# Patient Record
Sex: MALE | Employment: OTHER | URBAN - METROPOLITAN AREA
[De-identification: names, ages, dates, MRNs, and addresses within clinical notes are randomized per-mention and may not be internally consistent; named-entity substitution may affect disease eponyms.]

---

## 2024-04-23 SDOH — HEALTH STABILITY: PHYSICAL HEALTH: ON AVERAGE, HOW MANY DAYS PER WEEK DO YOU ENGAGE IN MODERATE TO STRENUOUS EXERCISE (LIKE A BRISK WALK)?: 4 DAYS

## 2024-04-23 SDOH — HEALTH STABILITY: PHYSICAL HEALTH: ON AVERAGE, HOW MANY MINUTES DO YOU ENGAGE IN EXERCISE AT THIS LEVEL?: 40 MIN

## 2024-04-25 ENCOUNTER — OFFICE VISIT (OUTPATIENT)
Age: 69
End: 2024-04-25
Payer: MEDICARE

## 2024-04-25 VITALS
OXYGEN SATURATION: 100 % | HEIGHT: 70 IN | HEART RATE: 68 BPM | WEIGHT: 171 LBS | SYSTOLIC BLOOD PRESSURE: 118 MMHG | TEMPERATURE: 98.2 F | BODY MASS INDEX: 24.48 KG/M2 | DIASTOLIC BLOOD PRESSURE: 66 MMHG

## 2024-04-25 DIAGNOSIS — E11.9 TYPE 2 DIABETES MELLITUS WITHOUT COMPLICATION, WITHOUT LONG-TERM CURRENT USE OF INSULIN (HCC): ICD-10-CM

## 2024-04-25 DIAGNOSIS — K40.90 NON-RECURRENT UNILATERAL INGUINAL HERNIA WITHOUT OBSTRUCTION OR GANGRENE: Primary | ICD-10-CM

## 2024-04-25 DIAGNOSIS — E78.2 MIXED HYPERLIPIDEMIA: ICD-10-CM

## 2024-04-25 PROCEDURE — G8427 DOCREV CUR MEDS BY ELIG CLIN: HCPCS

## 2024-04-25 PROCEDURE — 3017F COLORECTAL CA SCREEN DOC REV: CPT

## 2024-04-25 PROCEDURE — 4004F PT TOBACCO SCREEN RCVD TLK: CPT

## 2024-04-25 PROCEDURE — 99203 OFFICE O/P NEW LOW 30 MIN: CPT

## 2024-04-25 PROCEDURE — 2022F DILAT RTA XM EVC RTNOPTHY: CPT

## 2024-04-25 PROCEDURE — G8420 CALC BMI NORM PARAMETERS: HCPCS

## 2024-04-25 PROCEDURE — 1123F ACP DISCUSS/DSCN MKR DOCD: CPT

## 2024-04-25 PROCEDURE — 3046F HEMOGLOBIN A1C LEVEL >9.0%: CPT

## 2024-04-25 RX ORDER — SIMVASTATIN 40 MG
40 TABLET ORAL NIGHTLY
COMMUNITY

## 2024-04-25 RX ORDER — TRIAMCINOLONE ACETONIDE 1 MG/G
OINTMENT TOPICAL 2 TIMES DAILY
COMMUNITY

## 2024-04-25 RX ORDER — LISINOPRIL 5 MG/1
5 TABLET ORAL DAILY
COMMUNITY

## 2024-04-25 NOTE — PROGRESS NOTES
Kwaku Cobb is an 69 y.o. male who presents to [facility]   Patient was previously receiving care at: [facility]    - Diabetes: 1000mg BID. For years. Hover around 6.8-7.2.   Highest A1c was 7.8 years ago  - HTN: Lisinopril 5mg, never had high blood pressure, seemingly takes for renal protection  - HLD: Zocor 40mg, been on this dose for many years  - Gets blood work every 3 months    - Looking for a male provider. Comes from an office where all the providers are female    Concern for hernia: Friday  - Unloading the  truck  - Feela bulging in right groin, keeps pushing it back in, never stuck out or hard    Dad October 23, prostate cancer  - PSA have been normal in the past  - Gets PSA yearly    Got colonoscopy, reports being up to date with this    3 Sons, 3 Grandsons    Exercise:  - Walks daily  - [place]    COVID: Got 4-5 shots, Flu shot uptodate  - S/p shingrex    Smoking:  - During  1.5-2 packs per day  - Quit smoking for 5-6 years  - Back to smoking cigars 1 on a good day bad day 4  - Nicotine dependence    Currently not having any complaints apart from hernia concern    Current Medications  Current medications include:   Current Outpatient Medications   Medication Sig    simvastatin (ZOCOR) 40 MG tablet Take 1 tablet by mouth nightly    metFORMIN (GLUCOPHAGE) 500 MG tablet Take 1 tablet by mouth 2 times daily (with meals) Taking 2 tablets BID    lisinopril (PRINIVIL;ZESTRIL) 5 MG tablet Take 1 tablet by mouth daily    triamcinolone (KENALOG) 0.1 % ointment Apply topically 2 times daily Apply topically 2 times daily.     No current facility-administered medications for this visit.     Allergies  No Known Allergies    Past Medical History  No past medical history on file.    Past Surgical History   No past surgical history on file.    Family History  No family history on file.    Social History  Social History     Socioeconomic History    Marital status:      Spouse

## 2024-04-25 NOTE — PROGRESS NOTES
Identified pt with two pt identifiers(name and ). Reviewed record in preparation for visit and have obtained necessary documentation.  Chief Complaint   Patient presents with    Establish Care     DM type 2     Kwaku Jackson - PCP    CC- Hernia on right side noticed last week     Sciatica right lower back   Shoulder issues from swimming (\"butterflier\")- cortisone shots in the past             Vitals:    24 1323   BP: 118/66   Pulse: 68   Temp: 98.2 °F (36.8 °C)   TempSrc: Oral   SpO2: 100%   Weight: 77.6 kg (171 lb)   Height: 1.765 m (5' 9.5\")         Coordination of Care Questionnaire:  :     \"Have you been to the ER, urgent care clinic since your last visit?  Hospitalized since your last visit?\"    NO    “Have you seen or consulted any other health care providers outside of [facility] since your last visit?”    NO        “Have you had a colorectal cancer screening such as a colonoscopy/FIT/Cologuard?    yes    No colonoscopy on file  No cologuard on file  No FIT/FOBT on file   No flexible sigmoidoscopy on file         Click Here for Release of Records Request

## 2024-04-25 NOTE — PATIENT INSTRUCTIONS
Very nice to meet you! Please schedule your lab appointment on the way out and ask about the 3 month follow up

## 2024-04-26 ENCOUNTER — NURSE ONLY (OUTPATIENT)
Age: 69
End: 2024-04-26

## 2024-04-26 DIAGNOSIS — E78.2 MIXED HYPERLIPIDEMIA: ICD-10-CM

## 2024-04-26 DIAGNOSIS — E11.9 TYPE 2 DIABETES MELLITUS WITHOUT COMPLICATION, WITHOUT LONG-TERM CURRENT USE OF INSULIN (HCC): ICD-10-CM

## 2024-04-26 LAB
ANION GAP SERPL CALC-SCNC: 7 MMOL/L (ref 5–15)
BUN SERPL-MCNC: 17 MG/DL (ref 6–20)
BUN/CREAT SERPL: 15 (ref 12–20)
CALCIUM SERPL-MCNC: 10.2 MG/DL (ref 8.5–10.1)
CHLORIDE SERPL-SCNC: 106 MMOL/L (ref 97–108)
CHOLEST SERPL-MCNC: 136 MG/DL
CO2 SERPL-SCNC: 26 MMOL/L (ref 21–32)
CREAT SERPL-MCNC: 1.13 MG/DL (ref 0.7–1.3)
ERYTHROCYTE [DISTWIDTH] IN BLOOD BY AUTOMATED COUNT: 13.7 % (ref 11.5–14.5)
EST. AVERAGE GLUCOSE BLD GHB EST-MCNC: 134 MG/DL
GLUCOSE SERPL-MCNC: 117 MG/DL (ref 65–100)
HBA1C MFR BLD: 6.3 % (ref 4–5.6)
HCT VFR BLD AUTO: 44 % (ref 36.6–50.3)
HDLC SERPL-MCNC: 39 MG/DL
HDLC SERPL: 3.5 (ref 0–5)
HGB BLD-MCNC: 14.9 G/DL (ref 12.1–17)
LDLC SERPL CALC-MCNC: 74.6 MG/DL (ref 0–100)
MCH RBC QN AUTO: 30.8 PG (ref 26–34)
MCHC RBC AUTO-ENTMCNC: 33.9 G/DL (ref 30–36.5)
MCV RBC AUTO: 91.1 FL (ref 80–99)
NRBC # BLD: 0 K/UL (ref 0–0.01)
NRBC BLD-RTO: 0 PER 100 WBC
PLATELET # BLD AUTO: 291 K/UL (ref 150–400)
PMV BLD AUTO: 11.9 FL (ref 8.9–12.9)
POTASSIUM SERPL-SCNC: 4.2 MMOL/L (ref 3.5–5.1)
RBC # BLD AUTO: 4.83 M/UL (ref 4.1–5.7)
SODIUM SERPL-SCNC: 139 MMOL/L (ref 136–145)
TRIGL SERPL-MCNC: 112 MG/DL
VLDLC SERPL CALC-MCNC: 22.4 MG/DL
WBC # BLD AUTO: 10.3 K/UL (ref 4.1–11.1)

## 2024-06-11 ENCOUNTER — HOSPITAL ENCOUNTER (OUTPATIENT)
Facility: HOSPITAL | Age: 69
Discharge: HOME | End: 2024-06-14
Payer: MEDICARE

## 2024-06-11 VITALS
OXYGEN SATURATION: 97 % | WEIGHT: 171.1 LBS | DIASTOLIC BLOOD PRESSURE: 69 MMHG | TEMPERATURE: 97.8 F | HEIGHT: 69 IN | BODY MASS INDEX: 25.34 KG/M2 | SYSTOLIC BLOOD PRESSURE: 119 MMHG | RESPIRATION RATE: 18 BRPM | HEART RATE: 74 BPM

## 2024-06-11 LAB
ANION GAP SERPL CALC-SCNC: 3 MMOL/L (ref 5–15)
BUN SERPL-MCNC: 19 MG/DL (ref 6–20)
BUN/CREAT SERPL: 17 (ref 12–20)
CALCIUM SERPL-MCNC: 9.7 MG/DL (ref 8.5–10.1)
CHLORIDE SERPL-SCNC: 106 MMOL/L (ref 97–108)
CO2 SERPL-SCNC: 29 MMOL/L (ref 21–32)
CREAT SERPL-MCNC: 1.1 MG/DL (ref 0.7–1.3)
GLUCOSE SERPL-MCNC: 111 MG/DL (ref 65–100)
POTASSIUM SERPL-SCNC: 4.6 MMOL/L (ref 3.5–5.1)
SARS-COV-2 RNA RESP QL NAA+PROBE: NOT DETECTED
SODIUM SERPL-SCNC: 138 MMOL/L (ref 136–145)
SOURCE: NORMAL

## 2024-06-11 PROCEDURE — 93005 ELECTROCARDIOGRAM TRACING: CPT | Performed by: SURGERY

## 2024-06-11 PROCEDURE — 87635 SARS-COV-2 COVID-19 AMP PRB: CPT

## 2024-06-11 PROCEDURE — 80048 BASIC METABOLIC PNL TOTAL CA: CPT

## 2024-06-11 PROCEDURE — 36415 COLL VENOUS BLD VENIPUNCTURE: CPT

## 2024-06-11 NOTE — DISCHARGE INSTRUCTIONS
[facility]                   [address] [place] [address]   MAIN OR                                  [phone]   MAIN PRE OP                          [phone]                                                                                AMBULATORY PRE OP          [phone]  PRE-ADMISSION TESTING    [phone]     Surgery Date:  Tuesday 6/25/24       Is surgery arrival time given by surgeon?   If “NO”, [facility] staff will call you between 3 and 7pm the day before your surgery with your arrival time.    Call [phone] after 7pm Monday-Friday if you did not receive this call.    INSTRUCTIONS BEFORE YOUR SURGERY   When You  Arrive Free  parking is available from 7:00 AM - 5:00 PM.  Arrive at the 2nd Floor Admitting Desk on the day of your surgery  Have your insurance card, photo ID, and any copayment (if needed)   Food   and   Drink NO food or drink after midnight the night before surgery    This means NO water, gum, mints, coffee, juice, etc.  No alcohol (beer, wine, liquor) 24 hours before and after surgery   Pre- operative  Medication Instructions   MEDICATIONS TO TAKE THE MORNING OF SURGERY WITH A SIP OF WATER:     None  DO NOT TAKE THE FOLLOWING MEDICATIONS THE MORNING OF SURGERY:                             Lisinopril, Metformin    DO NOT TAKE THE FOLLOWING MEDICATIONS THE EVENING BEFORE SURGERY:                  N/A  CONTACT PRESCRIBING DOCTOR TO OBTAIN INSTRUCTIONS FOR THE FOLLOWING MEDICATIONS:                                N/A    Tylenol may be taken as needed.   Medications  To  STOP      7 days before surgery Non-Steroidal anti-inflammatory Drugs (NSAID's): for example, Ibuprofen (Advil, Motrin), Naproxen (Aleve)  Aspirin, if taking for pain   Herbal supplements, vitamins, and fish oil  Other:     Blood  Thinners If you take  Aspirin, Plavix, Coumadin, or any blood-thinning or anti-blood clot medicine, talk to the doctor who prescribed the

## 2024-06-12 LAB
EKG ATRIAL RATE: 58 BPM
EKG DIAGNOSIS: NORMAL
EKG P AXIS: 67 DEGREES
EKG P-R INTERVAL: 144 MS
EKG Q-T INTERVAL: 402 MS
EKG QRS DURATION: 82 MS
EKG QTC CALCULATION (BAZETT): 394 MS
EKG T AXIS: 66 DEGREES
EKG VENTRICULAR RATE: 58 BPM

## 2024-06-13 ENCOUNTER — OFFICE VISIT (OUTPATIENT)
Age: 69
End: 2024-06-13
Payer: MEDICARE

## 2024-06-13 VITALS
TEMPERATURE: 98.5 F | DIASTOLIC BLOOD PRESSURE: 70 MMHG | WEIGHT: 172 LBS | HEART RATE: 82 BPM | BODY MASS INDEX: 24.62 KG/M2 | OXYGEN SATURATION: 95 % | RESPIRATION RATE: 18 BRPM | SYSTOLIC BLOOD PRESSURE: 120 MMHG | HEIGHT: 70 IN

## 2024-06-13 DIAGNOSIS — B34.9 VIRAL ILLNESS: ICD-10-CM

## 2024-06-13 DIAGNOSIS — R05.2 SUBACUTE COUGH: Primary | ICD-10-CM

## 2024-06-13 PROCEDURE — 99212 OFFICE O/P EST SF 10 MIN: CPT

## 2024-06-13 SDOH — ECONOMIC STABILITY: FOOD INSECURITY: WITHIN THE PAST 12 MONTHS, THE FOOD YOU BOUGHT JUST DIDN'T LAST AND YOU DIDN'T HAVE MONEY TO GET MORE.: NEVER TRUE

## 2024-06-13 SDOH — ECONOMIC STABILITY: FOOD INSECURITY: WITHIN THE PAST 12 MONTHS, YOU WORRIED THAT YOUR FOOD WOULD RUN OUT BEFORE YOU GOT MONEY TO BUY MORE.: NEVER TRUE

## 2024-06-13 SDOH — ECONOMIC STABILITY: HOUSING INSECURITY
IN THE LAST 12 MONTHS, WAS THERE A TIME WHEN YOU DID NOT HAVE A STEADY PLACE TO SLEEP OR SLEPT IN A SHELTER (INCLUDING NOW)?: NO

## 2024-06-13 SDOH — ECONOMIC STABILITY: INCOME INSECURITY: HOW HARD IS IT FOR YOU TO PAY FOR THE VERY BASICS LIKE FOOD, HOUSING, MEDICAL CARE, AND HEATING?: NOT HARD AT ALL

## 2024-06-13 ASSESSMENT — PATIENT HEALTH QUESTIONNAIRE - PHQ9
SUM OF ALL RESPONSES TO PHQ QUESTIONS 1-9: 0
2. FEELING DOWN, DEPRESSED OR HOPELESS: NOT AT ALL
SUM OF ALL RESPONSES TO PHQ9 QUESTIONS 1 & 2: 0
1. LITTLE INTEREST OR PLEASURE IN DOING THINGS: NOT AT ALL
SUM OF ALL RESPONSES TO PHQ QUESTIONS 1-9: 0

## 2024-06-13 NOTE — PROGRESS NOTES
Identified pt with two pt identifiers(name and ). Reviewed record in preparation for visit and have obtained necessary documentation.  Chief Complaint   Patient presents with    Cough     Started in May with drainage running down throat causing cough ( prod at times with green mucous)    Denies sinus pressure with headache    Denies F/C or shortness of breath    Having 2024 for [facility]     Health Maintenance Due   Topic    COVID-19 Vaccine (1)    Pneumococcal 65+ years Vaccine (1 of 2 - PCV)    Depression Screen     Diabetic Alb to Cr ratio (uACR) test     Hepatitis C screen     DTaP/Tdap/Td vaccine (1 - Tdap)    Colorectal Cancer Screen     Shingles vaccine (1 of 2)    Respiratory Syncytial Virus (RSV) Pregnant or age 60 yrs+ (1 - 1-dose 60+ series)    AAA screen     Annual Wellness Visit (Medicare)        There were no vitals filed for this visit.    Social Determinants Of Health:       SDOH screening not required at visit.  Resources Declined.   See AVS for attached resources, if requested.    Coordination of Care Questionnaire:  :   @ROOMING->ROOMING(CIRCULAR REFERENCE)@    This patient is accompanied in the office by his self.  I have received verbal consent from Kwaku Cobb to discuss any/all medical information while they are present in the room.

## 2024-06-24 ENCOUNTER — ANESTHESIA EVENT (OUTPATIENT)
Facility: HOSPITAL | Age: 69
End: 2024-06-24
Payer: MEDICARE

## 2024-06-25 ENCOUNTER — HOSPITAL ENCOUNTER (OUTPATIENT)
Facility: HOSPITAL | Age: 69
Setting detail: OUTPATIENT SURGERY
Discharge: HOME | End: 2024-06-25
Attending: SURGERY | Admitting: SURGERY
Payer: MEDICARE

## 2024-06-25 ENCOUNTER — ANESTHESIA (OUTPATIENT)
Facility: HOSPITAL | Age: 69
End: 2024-06-25
Payer: MEDICARE

## 2024-06-25 VITALS
DIASTOLIC BLOOD PRESSURE: 55 MMHG | HEART RATE: 81 BPM | RESPIRATION RATE: 15 BRPM | SYSTOLIC BLOOD PRESSURE: 118 MMHG | WEIGHT: 171.08 LBS | OXYGEN SATURATION: 95 % | BODY MASS INDEX: 24.9 KG/M2 | TEMPERATURE: 97.6 F

## 2024-06-25 DIAGNOSIS — K40.90 INGUINAL HERNIA WITHOUT OBSTRUCTION OR GANGRENE, RECURRENCE NOT SPECIFIED, UNSPECIFIED LATERALITY: Primary | ICD-10-CM

## 2024-06-25 LAB
GLUCOSE BLD STRIP.AUTO-MCNC: 110 MG/DL (ref 65–117)
GLUCOSE BLD STRIP.AUTO-MCNC: 124 MG/DL (ref 65–117)
SERVICE CMNT-IMP: ABNORMAL
SERVICE CMNT-IMP: NORMAL

## 2024-06-25 PROCEDURE — 6370000000 HC RX 637 (ALT 250 FOR IP): Performed by: SURGERY

## 2024-06-25 PROCEDURE — 2580000003 HC RX 258: Performed by: ANESTHESIOLOGY

## 2024-06-25 PROCEDURE — S2900 ROBOTIC SURGICAL SYSTEM: HCPCS | Performed by: SURGERY

## 2024-06-25 PROCEDURE — 6360000002 HC RX W HCPCS: Performed by: SURGERY

## 2024-06-25 PROCEDURE — 6360000002 HC RX W HCPCS: Performed by: NURSE ANESTHETIST, CERTIFIED REGISTERED

## 2024-06-25 PROCEDURE — 3600000009 HC SURGERY ROBOT BASE: Performed by: SURGERY

## 2024-06-25 PROCEDURE — C1781 MESH (IMPLANTABLE): HCPCS | Performed by: SURGERY

## 2024-06-25 PROCEDURE — 3700000001 HC ADD 15 MINUTES (ANESTHESIA): Performed by: SURGERY

## 2024-06-25 PROCEDURE — C9290 INJ, BUPIVACAINE LIPOSOME: HCPCS | Performed by: SURGERY

## 2024-06-25 PROCEDURE — 7100000010 HC PHASE II RECOVERY - FIRST 15 MIN: Performed by: SURGERY

## 2024-06-25 PROCEDURE — 7100000000 HC PACU RECOVERY - FIRST 15 MIN: Performed by: SURGERY

## 2024-06-25 PROCEDURE — 2709999900 HC NON-CHARGEABLE SUPPLY: Performed by: SURGERY

## 2024-06-25 PROCEDURE — 2500000003 HC RX 250 WO HCPCS: Performed by: NURSE ANESTHETIST, CERTIFIED REGISTERED

## 2024-06-25 PROCEDURE — 3600000019 HC SURGERY ROBOT ADDTL 15MIN: Performed by: SURGERY

## 2024-06-25 PROCEDURE — 7100000001 HC PACU RECOVERY - ADDTL 15 MIN: Performed by: SURGERY

## 2024-06-25 PROCEDURE — 2580000003 HC RX 258: Performed by: SURGERY

## 2024-06-25 PROCEDURE — 82962 GLUCOSE BLOOD TEST: CPT

## 2024-06-25 PROCEDURE — 3700000000 HC ANESTHESIA ATTENDED CARE: Performed by: SURGERY

## 2024-06-25 DEVICE — 3DMAX MESH, 10.8 CM X 16.0 CM (4.3" X 6.3"), RIGHT, LARGE
Type: IMPLANTABLE DEVICE | Site: INGUINAL | Status: FUNCTIONAL
Brand: 3DMAX

## 2024-06-25 RX ORDER — MIDAZOLAM HYDROCHLORIDE 2 MG/2ML
2 INJECTION, SOLUTION INTRAMUSCULAR; INTRAVENOUS
Status: DISCONTINUED | OUTPATIENT
Start: 2024-06-25 | End: 2024-06-25 | Stop reason: HOSPADM

## 2024-06-25 RX ORDER — ONDANSETRON 2 MG/ML
4 INJECTION INTRAMUSCULAR; INTRAVENOUS
Status: DISCONTINUED | OUTPATIENT
Start: 2024-06-25 | End: 2024-06-25 | Stop reason: HOSPADM

## 2024-06-25 RX ORDER — DEXAMETHASONE SODIUM PHOSPHATE 4 MG/ML
INJECTION, SOLUTION INTRA-ARTICULAR; INTRALESIONAL; INTRAMUSCULAR; INTRAVENOUS; SOFT TISSUE PRN
Status: DISCONTINUED | OUTPATIENT
Start: 2024-06-25 | End: 2024-06-25 | Stop reason: SDUPTHER

## 2024-06-25 RX ORDER — DIPHENHYDRAMINE HYDROCHLORIDE 50 MG/ML
12.5 INJECTION INTRAMUSCULAR; INTRAVENOUS
Status: DISCONTINUED | OUTPATIENT
Start: 2024-06-25 | End: 2024-06-25 | Stop reason: HOSPADM

## 2024-06-25 RX ORDER — FENTANYL CITRATE 50 UG/ML
INJECTION, SOLUTION INTRAMUSCULAR; INTRAVENOUS PRN
Status: DISCONTINUED | OUTPATIENT
Start: 2024-06-25 | End: 2024-06-25 | Stop reason: SDUPTHER

## 2024-06-25 RX ORDER — LIDOCAINE HYDROCHLORIDE 10 MG/ML
1 INJECTION, SOLUTION EPIDURAL; INFILTRATION; INTRACAUDAL; PERINEURAL
Status: DISCONTINUED | OUTPATIENT
Start: 2024-06-25 | End: 2024-06-25 | Stop reason: HOSPADM

## 2024-06-25 RX ORDER — SODIUM CHLORIDE, SODIUM LACTATE, POTASSIUM CHLORIDE, CALCIUM CHLORIDE 600; 310; 30; 20 MG/100ML; MG/100ML; MG/100ML; MG/100ML
INJECTION, SOLUTION INTRAVENOUS CONTINUOUS
Status: DISCONTINUED | OUTPATIENT
Start: 2024-06-25 | End: 2024-06-25 | Stop reason: HOSPADM

## 2024-06-25 RX ORDER — ROCURONIUM BROMIDE 10 MG/ML
INJECTION, SOLUTION INTRAVENOUS PRN
Status: DISCONTINUED | OUTPATIENT
Start: 2024-06-25 | End: 2024-06-25 | Stop reason: SDUPTHER

## 2024-06-25 RX ORDER — PROPOFOL 10 MG/ML
INJECTION, EMULSION INTRAVENOUS PRN
Status: DISCONTINUED | OUTPATIENT
Start: 2024-06-25 | End: 2024-06-25 | Stop reason: SDUPTHER

## 2024-06-25 RX ORDER — ONDANSETRON 2 MG/ML
INJECTION INTRAMUSCULAR; INTRAVENOUS PRN
Status: DISCONTINUED | OUTPATIENT
Start: 2024-06-25 | End: 2024-06-25 | Stop reason: SDUPTHER

## 2024-06-25 RX ORDER — NALOXONE HYDROCHLORIDE 0.4 MG/ML
INJECTION, SOLUTION INTRAMUSCULAR; INTRAVENOUS; SUBCUTANEOUS PRN
Status: DISCONTINUED | OUTPATIENT
Start: 2024-06-25 | End: 2024-06-25 | Stop reason: HOSPADM

## 2024-06-25 RX ORDER — LIDOCAINE HYDROCHLORIDE 20 MG/ML
INJECTION, SOLUTION EPIDURAL; INFILTRATION; INTRACAUDAL; PERINEURAL PRN
Status: DISCONTINUED | OUTPATIENT
Start: 2024-06-25 | End: 2024-06-25 | Stop reason: SDUPTHER

## 2024-06-25 RX ORDER — FENTANYL CITRATE 50 UG/ML
100 INJECTION, SOLUTION INTRAMUSCULAR; INTRAVENOUS
Status: DISCONTINUED | OUTPATIENT
Start: 2024-06-25 | End: 2024-06-25 | Stop reason: HOSPADM

## 2024-06-25 RX ORDER — MIDAZOLAM HYDROCHLORIDE 1 MG/ML
INJECTION INTRAMUSCULAR; INTRAVENOUS PRN
Status: DISCONTINUED | OUTPATIENT
Start: 2024-06-25 | End: 2024-06-25 | Stop reason: SDUPTHER

## 2024-06-25 RX ORDER — SUCCINYLCHOLINE/SOD CL,ISO/PF 100 MG/5ML
SYRINGE (ML) INTRAVENOUS PRN
Status: DISCONTINUED | OUTPATIENT
Start: 2024-06-25 | End: 2024-06-25 | Stop reason: SDUPTHER

## 2024-06-25 RX ORDER — KETOROLAC TROMETHAMINE 30 MG/ML
INJECTION, SOLUTION INTRAMUSCULAR; INTRAVENOUS PRN
Status: DISCONTINUED | OUTPATIENT
Start: 2024-06-25 | End: 2024-06-25 | Stop reason: SDUPTHER

## 2024-06-25 RX ORDER — ACETAMINOPHEN 325 MG/1
650 TABLET ORAL ONCE
Status: COMPLETED | OUTPATIENT
Start: 2024-06-25 | End: 2024-06-25

## 2024-06-25 RX ORDER — HYDROCODONE BITARTRATE AND ACETAMINOPHEN 5; 325 MG/1; MG/1
1 TABLET ORAL EVERY 6 HOURS PRN
Qty: 10 TABLET | Refills: 0 | Status: SHIPPED | OUTPATIENT
Start: 2024-06-25 | End: 2024-06-28

## 2024-06-25 RX ORDER — ONDANSETRON 4 MG/1
4 TABLET, ORALLY DISINTEGRATING ORAL 3 TIMES DAILY PRN
Qty: 14 TABLET | Refills: 0 | Status: SHIPPED | OUTPATIENT
Start: 2024-06-25

## 2024-06-25 RX ORDER — EPHEDRINE SULFATE/0.9% NACL/PF 25 MG/5 ML
SYRINGE (ML) INTRAVENOUS PRN
Status: DISCONTINUED | OUTPATIENT
Start: 2024-06-25 | End: 2024-06-25 | Stop reason: SDUPTHER

## 2024-06-25 RX ADMIN — MIDAZOLAM HYDROCHLORIDE 2 MG: 1 INJECTION, SOLUTION INTRAMUSCULAR; INTRAVENOUS at 14:45

## 2024-06-25 RX ADMIN — WATER 2000 MG: 1 INJECTION INTRAMUSCULAR; INTRAVENOUS; SUBCUTANEOUS at 15:04

## 2024-06-25 RX ADMIN — LIDOCAINE HYDROCHLORIDE 40 MG: 20 INJECTION, SOLUTION EPIDURAL; INFILTRATION; INTRACAUDAL; PERINEURAL at 14:57

## 2024-06-25 RX ADMIN — SUGAMMADEX 200 MG: 100 INJECTION, SOLUTION INTRAVENOUS at 16:21

## 2024-06-25 RX ADMIN — KETOROLAC TROMETHAMINE 30 MG: 30 INJECTION, SOLUTION INTRAMUSCULAR at 16:10

## 2024-06-25 RX ADMIN — ACETAMINOPHEN 650 MG: 325 TABLET ORAL at 11:31

## 2024-06-25 RX ADMIN — FENTANYL CITRATE 100 MCG: 50 INJECTION, SOLUTION INTRAMUSCULAR; INTRAVENOUS at 15:32

## 2024-06-25 RX ADMIN — SODIUM CHLORIDE, POTASSIUM CHLORIDE, SODIUM LACTATE AND CALCIUM CHLORIDE: 600; 310; 30; 20 INJECTION, SOLUTION INTRAVENOUS at 11:37

## 2024-06-25 RX ADMIN — EPHEDRINE SULFATE 10 MG: 5 INJECTION INTRAVENOUS at 16:03

## 2024-06-25 RX ADMIN — SODIUM CHLORIDE, POTASSIUM CHLORIDE, SODIUM LACTATE AND CALCIUM CHLORIDE: 600; 310; 30; 20 INJECTION, SOLUTION INTRAVENOUS at 11:36

## 2024-06-25 RX ADMIN — Medication 100 MG: at 14:58

## 2024-06-25 RX ADMIN — ROCURONIUM BROMIDE 10 MG: 10 INJECTION, SOLUTION INTRAVENOUS at 14:56

## 2024-06-25 RX ADMIN — LIDOCAINE HYDROCHLORIDE 20 MG: 20 INJECTION, SOLUTION EPIDURAL; INFILTRATION; INTRACAUDAL; PERINEURAL at 14:56

## 2024-06-25 RX ADMIN — FENTANYL CITRATE 100 MCG: 50 INJECTION, SOLUTION INTRAMUSCULAR; INTRAVENOUS at 14:52

## 2024-06-25 RX ADMIN — ONDANSETRON 4 MG: 2 INJECTION INTRAMUSCULAR; INTRAVENOUS at 16:06

## 2024-06-25 RX ADMIN — PROPOFOL 150 MG: 10 INJECTION, EMULSION INTRAVENOUS at 14:57

## 2024-06-25 RX ADMIN — ROCURONIUM BROMIDE 30 MG: 10 INJECTION, SOLUTION INTRAVENOUS at 15:02

## 2024-06-25 RX ADMIN — DEXAMETHASONE SODIUM PHOSPHATE 4 MG: 4 INJECTION INTRA-ARTICULAR; INTRALESIONAL; INTRAMUSCULAR; INTRAVENOUS; SOFT TISSUE at 15:12

## 2024-06-25 ASSESSMENT — PAIN - FUNCTIONAL ASSESSMENT
PAIN_FUNCTIONAL_ASSESSMENT: NONE - DENIES PAIN
PAIN_FUNCTIONAL_ASSESSMENT: ACTIVITIES ARE NOT PREVENTED

## 2024-06-25 ASSESSMENT — PAIN DESCRIPTION - FREQUENCY: FREQUENCY: CONTINUOUS

## 2024-06-25 ASSESSMENT — PAIN DESCRIPTION - ONSET: ONSET: OTHER (COMMENT)

## 2024-06-25 ASSESSMENT — PAIN SCALES - GENERAL
PAINLEVEL_OUTOF10: 2
PAINLEVEL_OUTOF10: 0
PAINLEVEL_OUTOF10: 0

## 2024-06-25 ASSESSMENT — PAIN DESCRIPTION - DESCRIPTORS: DESCRIPTORS: SORE

## 2024-06-25 ASSESSMENT — PAIN DESCRIPTION - LOCATION: LOCATION: ABDOMEN

## 2024-06-25 ASSESSMENT — PAIN DESCRIPTION - ORIENTATION: ORIENTATION: MID

## 2024-06-25 ASSESSMENT — LIFESTYLE VARIABLES: SMOKING_STATUS: 1

## 2024-06-25 ASSESSMENT — PAIN DESCRIPTION - PAIN TYPE: TYPE: SURGICAL PAIN

## 2024-06-25 NOTE — BRIEF OP NOTE
Brief Postoperative Note      Patient: Kwaku Cobb  YOB: 1955  MRN: [id number]    Date of Procedure: 6/25/2024    Pre-Op Diagnosis Codes:     * Non-recurrent inguinal hernia without obstruction or gangrene, unspecified laterality [K40.90]    Post-Op Diagnosis: Same       Procedure(s):  ROBOTIC RIGHT INGUINAL HERNIA REPAIR WITH MESH    Surgeon(s):  Sim Rose MD    Assistant:  Surgical Assistant: Lesli Contreras    Anesthesia: General    Estimated Blood Loss (mL): Minimal    Complications: None    Specimens:   * No specimens in log *    Implants:  Implant Name Type Inv. Item Serial No.  Lot No. LRB No. Used Action   MESH PATSY L [id number] R INGUINAL WHT POLYPR MFIL - SN/A  MESH PATSY L [id number] R INGUINAL WHT POLYPR MFIL N/A BARD DAVOL-WD [id number] Right 1 Implanted         Drains: * No LDAs found *    Findings:  Infection Present At Time Of Surgery (PATOS) (choose all levels that have infection present):  No infection present  Other Findings: Indirect defect    Electronically signed by Sim Rose MD on 6/25/2024 at 4:30 PM

## 2024-06-25 NOTE — ANESTHESIA POSTPROCEDURE EVALUATION
Department of Anesthesiology  Postprocedure Note    Patient: Kwaku Cobb  MRN: [id number]  YOB: 1955  Date of evaluation: 6/25/2024    Procedure Summary       Date: 06/25/24 Room / Location: [facility] MAIN OR  / M MAIN OR    Anesthesia Start: 1445 Anesthesia Stop: 1641    Procedure: ROBOTIC RIGHT INGUINAL HERNIA REPAIR WITH MESH (Abdomen) Diagnosis:       Non-recurrent inguinal hernia without obstruction or gangrene, unspecified laterality      (Non-recurrent inguinal hernia without obstruction or gangrene, unspecified laterality [K40.90])    Surgeons: Sim Rose MD Responsible Provider: Wilver Zurita MD    Anesthesia Type: general ASA Status: 2            Anesthesia Type: No value filed.    Tabitha Phase I: Tabitha Score: 8    Tabitha Phase II:      Anesthesia Post Evaluation    Patient location during evaluation: PACU  Patient participation: complete - patient participated  Level of consciousness: awake  Airway patency: patent  Nausea & Vomiting: no vomiting and no nausea  Cardiovascular status: hemodynamically stable  Respiratory status: acceptable  Hydration status: stable  Pain management: adequate    No notable events documented.

## 2024-06-25 NOTE — OP NOTE
Operative Note      Patient: Kwaku Cobb  YOB: 1955  MRN: [id number]    Date of Procedure: 6/25/2024    Pre-Op Diagnosis Codes:     * Non-recurrent inguinal hernia without obstruction or gangrene, unspecified laterality [K40.90]    Post-Op Diagnosis: Same       Procedure(s):  ROBOTIC RIGHT INGUINAL HERNIA REPAIR WITH MESH    Surgeon(s):  Sim Rose MD    Assistant:  Surgical Assistant: Lesli Contreras    Anesthesia: General    Estimated Blood Loss (mL): Minimal    Complications: None    Specimens:   * No specimens in log *    Implants:  Implant Name Type Inv. Item Serial No.  Lot No. LRB No. Used Action   MESH PATSY L [id number] R INGUINAL WHT POLYPR MFIL - SN/A  MESH PATSY L [id number] R INGUINAL WHT POLYPR MFIL N/A BARD DAVOL-WD [id number] Right 1 Implanted         Drains: * No LDAs found *    Findings:  Infection Present At Time Of Surgery (PATOS) (choose all levels that have infection present):  No infection present  Other Findings: Indirect defect    Electronically signed by Sim Rose MD on 6/25/2024 at 4:31 PM    Indication:  See H/P.    Procedure:  Site of surgery and procedure was verified and marked in pre-operative holding and again in the operating room.  Preoperative antibiotics were given 30 minutes prior to skin incision.  Sequential compression devices were placed and turned on.  Patient placed supine and general anesthesia was instituted sucessfully.  Both arms were tucked and the abdomen was prepped and draped in usual fashion.  Wade catheter was placed.  Exparel expanded with 0.5% plain marcaine was injected subcutaneously along the projected port sites.   Supra-umbilical incision was made, 5mm port was passed across the umbilical hernia under direct vision.  Insufflation was establised and maintained.  8mm ports were placed in usual position and the 5mm port was exchanged for 8mm port.  Robot was brought in and docked.    Dav's ligament, the inferior

## 2024-06-25 NOTE — DISCHARGE SUMMARY
Discharge Summary    Patient: Kwaku Cobb               Sex: male          DOA: 6/25/2024 10:40 AM       YOB: 1955      Age:  69 y.o.        LOS:  LOS: 0 days                Discharge Date:      Admission Diagnoses: Non-recurrent inguinal hernia without obstruction or gangrene, unspecified laterality [K40.90]    Discharge Diagnoses:  Same    Procedure:  Procedure(s):  ROBOTIC RIGHT INGUINAL HERNIA REPAIR WITH MESH    Discharge Condition: Good    Hospital Course: Unremarkable operative procedure.  Discharge to home in stable condition.      Consults: None    Significant Diagnostic Studies: See full electronic record.     Discharge Medications:     Current Discharge Medication List        CONTINUE these medications which have NOT CHANGED    Details   Multiple Vitamins-Minerals (MULTIVITAMIN MEN 50+) TABS Take by mouth daily      Phenyleph-Doxylamine-DM-APAP (NYQUIL SEVERE COLD/FLU) 5-6.25- MG CAPS Take by mouth as needed      simvastatin (ZOCOR) 40 MG tablet Take 1 tablet by mouth nightly      metFORMIN (GLUCOPHAGE) 500 MG tablet Take 1 tablet by mouth 2 times daily (with meals) Taking 2 tablets BID      lisinopril (PRINIVIL;ZESTRIL) 5 MG tablet Take 1 tablet by mouth daily      triamcinolone (KENALOG) 0.1 % ointment Apply topically 2 times daily Apply topically 2 times daily.           Activity/Diet/Wound Care: See patient administered discharge instructions.    Follow-up: Keep follow up appointment made by Cheri Rose MD  [facility]  Office:  [phone]  Fax:  [phone]

## 2024-06-25 NOTE — H&P
Stability Vital Sign     Unstable Housing in the Last Year: No      Current Facility-Administered Medications   Medication Dose Route Frequency    lidocaine PF 1 % injection 1 mL  1 mL IntraDERmal Once PRN    fentaNYL (SUBLIMAZE) injection 100 mcg  100 mcg IntraVENous Once PRN    lactated ringers IV soln infusion   IntraVENous Continuous    midazolam PF (VERSED) injection 2 mg  2 mg IntraVENous Once PRN    ceFAZolin (ANCEF) 2,000 mg in sterile water 20 mL IV syringe  2,000 mg IntraVENous Once    lactated ringers IV soln infusion   IntraVENous Continuous      Allergies   Allergen Reactions    Alpha-Gal Itching       Review of Systems:     []     Unable to obtain  ROS due to  []    mental status change  []    sedated   []    intubated   [x]    Total of 12 system negative, unless specified below or in HPI:  Constitutional: negative fever, negative chills, negative weight loss  Eyes:   negative visual changes  ENT:   negative sore throat, tongue or lip swelling  Respiratory:  negative cough, negative dyspnea  Cards:  negative for chest pain, palpitations, lower extremity edema  GI:   negative for nausea, vomiting, diarrhea, and abdominal pain  :  negative for frequency, dysuria  Integument:  negative for rash and pruritus  Heme:  negative for easy bruising and gum/nose bleeding  Musculoskel: negative for myalgias,  back pain and muscle weakness  Neuro:  negative for headaches, dizziness, vertigo  Psych:  negative for feelings of anxiety, depression     Objective:      Patient Vitals for the past 8 hrs:   BP Temp Temp src Pulse Resp SpO2 Weight   24 1144 126/68 98.3 °F (36.8 °C) Oral 71 14 99 % --   24 1044 -- -- -- -- -- -- 77.6 kg (171 lb 1.2 oz)       Temp (24hrs), Av.3 °F (36.8 °C), Min:98.3 °F (36.8 °C), Max:98.3 °F (36.8 °C)      Physical Exam:  General:  Alert, cooperative, no distress, appears stated age.   Eyes:  Conjunctivae/corneas clear. PERRL, EOMs intact.   Nose: Nares normal. Septum

## 2024-06-25 NOTE — ANESTHESIA PRE PROCEDURE
06/11/2024 11:58 AM    CREATININE 1.10 06/11/2024 11:58 AM    LABGLOM 74 06/11/2024 11:58 AM    LABGLOM 71 04/26/2024 09:25 AM    GLUCOSE 111 06/11/2024 11:58 AM    CALCIUM 9.7 06/11/2024 11:58 AM       POC Tests:   Recent Labs     06/25/24  1121   POCGLU 124*       Coags: No results found for: \"PROTIME\", \"INR\", \"APTT\"    HCG (If Applicable): No results found for: \"PREGTESTUR\", \"PREGSERUM\", \"HCG\", \"HCGQUANT\"     ABGs: No results found for: \"PHART\", \"PO2ART\", \"ZWM8LJQ\", \"FQZ8YZH\", \"BEART\", \"N0UBVLRD\"     Type & Screen (If Applicable):  No results found for: \"LABABO\"    Drug/Infectious Status (If Applicable):  No results found for: \"HIV\", \"HEPCAB\"    COVID-19 Screening (If Applicable):   Lab Results   Component Value Date/Time    COVID19 Not detected 06/11/2024 11:58 AM           Anesthesia Evaluation     no history of anesthetic complications:   Airway: Mallampati: II     Neck ROM: full  Mouth opening: > = 3 FB   Dental: normal exam         Pulmonary: breath sounds clear to auscultation  (+)    recent URI: resolved,  sleep apnea:       current smoker                           Cardiovascular:Negative CV ROS  Exercise tolerance: good (>4 METS)          Rhythm: regular  Rate: normal                    Neuro/Psych:   Negative Neuro/Psych ROS              GI/Hepatic/Renal:             Endo/Other:    (+) Diabetes.                 Abdominal:             Vascular: negative vascular ROS.         Other Findings:       Anesthesia Plan      general     ASA 2       Induction: intravenous.    MIPS: Postoperative opioids intended and Prophylactic antiemetics administered.  Anesthetic plan and risks discussed with patient.      Plan discussed with CRNA.                Wilver Zurita MD   6/25/2024

## 2024-06-25 NOTE — DISCHARGE INSTRUCTIONS
POST-OPERATIVE INSTRUCTIONS  OUTPATIENT SURGERY LAPAROSCOPIC/ROBOT ASSISTED HERNIA REPAIR    Today you underwent hernia repair which is usually well tolerated. However, below is a list of instructions which are important for you to follow. If you have any questions, please feel free to call the main office.    1. EATING: Please eat lightly when you go home today for the first 24 hours. You may resume your regular diet after that.    2. EXERCISE: Limit your exercise for the first week.  Stairs or other walking is fine, increasing the amount of aerobic activity after the first week.  No heavy lifting for four weeks.     3. DRESSING: You may shower in 24 hours; the clear dressing can get wet. 48 hours after your surgery, the top clear bandage may be removed and left undressed or covered with a lighter dressing.  Do not bathe in a tub or pool for at least 4 weeks.    4. NO LIFTING: Until you have seen your surgeon in his/her office following surgery, at which time you will receive further instructions.    5. DRIVING: No driving for at least 48 hours postoperatively.  When you are able to tolerate post-surgical pain WITHOUT the use of narcotics and manage the car without increased pain or restriction you may drive.  If your vehicle requires you to pull or hoist yourself into the vehicle, driving that vehicle is not advised.  You may ride as a passenger anytime. Otherwise, wait until the follow up visit.    6. PAIN: I try to make the post-operative course tolerable, but you will-and should-have some discomfort for a few days (even with pain medication.)      a)  Walking is fine, but too much activity after surgery can aggravate pain.  On the other hand, you don't need to be in bed all day either.  You should ambulate every few hours while awake.  Focus on basic activities like ambulating to bathroom, to meals, and very short trips outside (even to mailbox may be excessive) and go back to comfortable resting position with

## 2024-07-26 ENCOUNTER — OFFICE VISIT (OUTPATIENT)
Age: 69
End: 2024-07-26
Payer: MEDICARE

## 2024-07-26 VITALS
HEIGHT: 70 IN | HEART RATE: 73 BPM | SYSTOLIC BLOOD PRESSURE: 103 MMHG | TEMPERATURE: 98.2 F | BODY MASS INDEX: 24.91 KG/M2 | WEIGHT: 174 LBS | OXYGEN SATURATION: 97 % | RESPIRATION RATE: 20 BRPM | DIASTOLIC BLOOD PRESSURE: 66 MMHG

## 2024-07-26 DIAGNOSIS — E78.2 MIXED HYPERLIPIDEMIA: ICD-10-CM

## 2024-07-26 DIAGNOSIS — I10 PRIMARY HYPERTENSION: ICD-10-CM

## 2024-07-26 DIAGNOSIS — E11.9 TYPE 2 DIABETES MELLITUS WITHOUT COMPLICATION, WITHOUT LONG-TERM CURRENT USE OF INSULIN (HCC): Primary | ICD-10-CM

## 2024-07-26 DIAGNOSIS — Z12.5 SCREENING PSA (PROSTATE SPECIFIC ANTIGEN): ICD-10-CM

## 2024-07-26 LAB
ANION GAP SERPL CALC-SCNC: 9 MMOL/L (ref 5–15)
BUN SERPL-MCNC: 16 MG/DL (ref 6–20)
BUN/CREAT SERPL: 15 (ref 12–20)
CALCIUM SERPL-MCNC: 9.5 MG/DL (ref 8.5–10.1)
CHLORIDE SERPL-SCNC: 104 MMOL/L (ref 97–108)
CHOLEST SERPL-MCNC: 175 MG/DL
CO2 SERPL-SCNC: 25 MMOL/L (ref 21–32)
CREAT SERPL-MCNC: 1.07 MG/DL (ref 0.7–1.3)
CREAT UR-MCNC: 33.6 MG/DL
EST. AVERAGE GLUCOSE BLD GHB EST-MCNC: 137 MG/DL
GLUCOSE SERPL-MCNC: 102 MG/DL (ref 65–100)
HBA1C MFR BLD: 6.4 % (ref 4–5.6)
HDLC SERPL-MCNC: 42 MG/DL
HDLC SERPL: 4.2 (ref 0–5)
LDLC SERPL CALC-MCNC: 102.4 MG/DL (ref 0–100)
MICROALBUMIN UR-MCNC: <0.5 MG/DL
MICROALBUMIN/CREAT UR-RTO: <15 MG/G (ref 0–30)
POTASSIUM SERPL-SCNC: 4.4 MMOL/L (ref 3.5–5.1)
PSA SERPL-MCNC: 2.6 NG/ML (ref 0.01–4)
SODIUM SERPL-SCNC: 138 MMOL/L (ref 136–145)
TRIGL SERPL-MCNC: 153 MG/DL
VLDLC SERPL CALC-MCNC: 30.6 MG/DL

## 2024-07-26 PROCEDURE — 99214 OFFICE O/P EST MOD 30 MIN: CPT

## 2024-07-26 PROCEDURE — 3017F COLORECTAL CA SCREEN DOC REV: CPT

## 2024-07-26 PROCEDURE — G8419 CALC BMI OUT NRM PARAM NOF/U: HCPCS

## 2024-07-26 PROCEDURE — 3074F SYST BP LT 130 MM HG: CPT

## 2024-07-26 PROCEDURE — G8427 DOCREV CUR MEDS BY ELIG CLIN: HCPCS

## 2024-07-26 PROCEDURE — 2022F DILAT RTA XM EVC RTNOPTHY: CPT

## 2024-07-26 PROCEDURE — 4004F PT TOBACCO SCREEN RCVD TLK: CPT

## 2024-07-26 PROCEDURE — 3044F HG A1C LEVEL LT 7.0%: CPT

## 2024-07-26 PROCEDURE — 3078F DIAST BP <80 MM HG: CPT

## 2024-07-26 PROCEDURE — 1123F ACP DISCUSS/DSCN MKR DOCD: CPT

## 2024-07-26 ASSESSMENT — PATIENT HEALTH QUESTIONNAIRE - PHQ9
SUM OF ALL RESPONSES TO PHQ QUESTIONS 1-9: 0
2. FEELING DOWN, DEPRESSED OR HOPELESS: NOT AT ALL
SUM OF ALL RESPONSES TO PHQ QUESTIONS 1-9: 0
SUM OF ALL RESPONSES TO PHQ9 QUESTIONS 1 & 2: 0
1. LITTLE INTEREST OR PLEASURE IN DOING THINGS: NOT AT ALL

## 2024-07-26 NOTE — PROGRESS NOTES
[address] [id number]   Office [phone], Fax [phone]   Subjective     Chief Complaint   Patient presents with    Diabetes      Kwaku Cobb is a 69 y.o. male who presents for routine chronic condition follow up for TIIDM, HTN, HLD. He is doing well s/p inguinal hernia repair of recent with Dr. Rose. No acute concerns today    Past Medical History  Past Medical History:   Diagnosis Date    Diabetes mellitus (HCC)     JUDE (obstructive sleep apnea)     CPAP    Type 2 diabetes mellitus without complication (HCC)      Current Medications  Current Outpatient Medications   Medication Sig Dispense Refill    Multiple Vitamins-Minerals (MULTIVITAMIN MEN 50+) TABS Take by mouth daily      simvastatin (ZOCOR) 40 MG tablet Take 1 tablet by mouth nightly      metFORMIN (GLUCOPHAGE) 500 MG tablet Take 1 tablet by mouth 2 times daily (with meals) Taking 2 tablets BID      lisinopril (PRINIVIL;ZESTRIL) 5 MG tablet Take 1 tablet by mouth daily      triamcinolone (KENALOG) 0.1 % ointment Apply topically 2 times daily Apply topically 2 times daily.       No current facility-administered medications for this visit.     Objective   Vital Signs  /66 (Site: Right Upper Arm, Position: Sitting, Cuff Size: Medium Adult)   Pulse 73   Temp 98.2 °F (36.8 °C) (Oral)   Resp 20   Ht 1.765 m (5' 9.5\")   Wt 78.9 kg (174 lb)   SpO2 97%   BMI 25.33 kg/m²     Physical Examination  Cardio: Regular rate/rhythm, no murmurs  Pulm: Clear b/l, no wheezing, no crackles, no ronchi  Abd: Soft, non-tender, non-distended,well healing port scars    Assessment and Plan   Kwaku Cobb is a 69 y.o. who is here for chronic condition follow up. His diabetes, HTN, and HLD have all been very well controlled. Obtaining labs to re-evaluate. Continue current medication regimen for the below conditions without change barring lab abnormalities.    The natural history of prostate cancer and ongoing controversy regarding

## 2024-07-26 NOTE — PROGRESS NOTES
Identified pt with two pt identifiers(name and ). Reviewed record in preparation for visit and have obtained necessary documentation.  Chief Complaint   Patient presents with    Diabetes        Health Maintenance Due   Topic    COVID-19 Vaccine (1)    Pneumococcal 65+ years Vaccine (1 of 2 - PCV)    Diabetic Alb to Cr ratio (uACR) test     Hepatitis C screen     DTaP/Tdap/Td vaccine (1 - Tdap)    Colorectal Cancer Screen     Shingles vaccine (1 of 2)    Respiratory Syncytial Virus (RSV) Pregnant or age 60 yrs+ (1 - 1-dose 60+ series)    AAA screen     Annual Wellness Visit (Medicare)        Vitals:    24 0937   BP: 103/66   Site: Right Upper Arm   Position: Sitting   Cuff Size: Medium Adult   Pulse: 73   Resp: 20   Temp: 98.2 °F (36.8 °C)   TempSrc: Oral   SpO2: 97%   Weight: 78.9 kg (174 lb)   Height: 1.765 m (5' 9.5\")         \"Have you been to the ER, urgent care clinic since your last visit?  Hospitalized since your last visit?\"    YES, 24 hernia surgery, [facility]     “Have you seen or consulted any other health care providers outside of [facility] since your last visit?”    Yes, Dentist        “Have you had a colorectal cancer screening such as a colonoscopy/FIT/Cologuard?    Yes     No colonoscopy on file  No cologuard on file  No FIT/FOBT on file   No flexible sigmoidoscopy on file         Click Here for Release of Records Request     This patient is accompanied in the office by his self.  I have received verbal consent from Kwaku Cobb to discuss any/all medical information while they are present in the room.

## 2024-09-17 ENCOUNTER — PATIENT MESSAGE (OUTPATIENT)
Age: 69
End: 2024-09-17

## 2024-09-20 RX ORDER — SIMVASTATIN 40 MG
40 TABLET ORAL NIGHTLY
Qty: 90 TABLET | Refills: 3 | Status: SHIPPED | OUTPATIENT
Start: 2024-09-20

## 2024-09-20 RX ORDER — LISINOPRIL 5 MG/1
5 TABLET ORAL DAILY
Qty: 90 TABLET | Refills: 3 | Status: SHIPPED | OUTPATIENT
Start: 2024-09-20

## 2024-10-25 ENCOUNTER — OFFICE VISIT (OUTPATIENT)
Age: 69
End: 2024-10-25
Payer: MEDICARE

## 2024-10-25 VITALS
DIASTOLIC BLOOD PRESSURE: 74 MMHG | SYSTOLIC BLOOD PRESSURE: 118 MMHG | BODY MASS INDEX: 25.37 KG/M2 | RESPIRATION RATE: 16 BRPM | HEIGHT: 70 IN | OXYGEN SATURATION: 100 % | HEART RATE: 63 BPM | WEIGHT: 177.2 LBS

## 2024-10-25 DIAGNOSIS — E78.2 MIXED HYPERLIPIDEMIA: ICD-10-CM

## 2024-10-25 DIAGNOSIS — R53.83 DECREASED ENERGY: ICD-10-CM

## 2024-10-25 DIAGNOSIS — Z87.891 PERSONAL HISTORY OF NICOTINE DEPENDENCE: ICD-10-CM

## 2024-10-25 DIAGNOSIS — Z13.6 ENCOUNTER FOR SCREENING FOR CARDIOVASCULAR DISORDERS: ICD-10-CM

## 2024-10-25 DIAGNOSIS — L65.9 HAIR LOSS: ICD-10-CM

## 2024-10-25 DIAGNOSIS — I10 PRIMARY HYPERTENSION: ICD-10-CM

## 2024-10-25 DIAGNOSIS — Z72.0 TOBACCO CONSUMPTION: ICD-10-CM

## 2024-10-25 DIAGNOSIS — R68.82 LIBIDO, DECREASED: ICD-10-CM

## 2024-10-25 DIAGNOSIS — E11.9 TYPE 2 DIABETES MELLITUS WITHOUT COMPLICATION, WITHOUT LONG-TERM CURRENT USE OF INSULIN (HCC): Primary | ICD-10-CM

## 2024-10-25 LAB
CHOLEST SERPL-MCNC: 152 MG/DL
EST. AVERAGE GLUCOSE BLD GHB EST-MCNC: 134 MG/DL
HBA1C MFR BLD: 6.3 % (ref 4–5.6)
HDLC SERPL-MCNC: 35 MG/DL
HDLC SERPL: 4.3 (ref 0–5)
LDLC SERPL CALC-MCNC: 76.2 MG/DL (ref 0–100)
TRIGL SERPL-MCNC: 204 MG/DL
VLDLC SERPL CALC-MCNC: 40.8 MG/DL

## 2024-10-25 PROCEDURE — 99213 OFFICE O/P EST LOW 20 MIN: CPT

## 2024-10-25 ASSESSMENT — PATIENT HEALTH QUESTIONNAIRE - PHQ9
SUM OF ALL RESPONSES TO PHQ QUESTIONS 1-9: 0
1. LITTLE INTEREST OR PLEASURE IN DOING THINGS: NOT AT ALL
SUM OF ALL RESPONSES TO PHQ QUESTIONS 1-9: 0
2. FEELING DOWN, DEPRESSED OR HOPELESS: NOT AT ALL
SUM OF ALL RESPONSES TO PHQ9 QUESTIONS 1 & 2: 0
SUM OF ALL RESPONSES TO PHQ QUESTIONS 1-9: 0
SUM OF ALL RESPONSES TO PHQ QUESTIONS 1-9: 0

## 2024-10-25 ASSESSMENT — LIFESTYLE VARIABLES: HOW MANY STANDARD DRINKS CONTAINING ALCOHOL DO YOU HAVE ON A TYPICAL DAY: 1 OR 2

## 2024-10-25 NOTE — PROGRESS NOTES
[address] [place] [address]   Office [phone], Fax [phone]   Subjective     Chief Complaint   Patient presents with    Follow-up Chronic Condition     Per patient, DX of HTN on file. Currently on medication used for HTN but never formally DX with HTN.       Medicare AWV     Immunization Records provided      Kwaku Cobb is a 69 y.o. male who presents for the above.    No concerns regarding chronic conditions. Tells me he has never had elevated BP and just on lisinopril for renal protection    # Hair Loss, Low Energy, Testosterone levels  - Thinning recently  - Asking about hair loss medications  - Asking about T levels, feels tired, low libido    Past Medical History  Past Medical History:   Diagnosis Date    Diabetes mellitus (HCC)     JUDE (obstructive sleep apnea)     CPAP    Type 2 diabetes mellitus without complication (HCC)        Current Medications  Current Outpatient Medications   Medication Sig Dispense Refill    simvastatin (ZOCOR) 40 MG tablet Take 1 tablet by mouth nightly 90 tablet 3    metFORMIN (GLUCOPHAGE) 500 MG tablet Take 1 tablet by mouth 2 times daily (with meals) Taking 2 tablets  tablet 3    lisinopril (PRINIVIL;ZESTRIL) 5 MG tablet Take 1 tablet by mouth daily 90 tablet 3    Multiple Vitamins-Minerals (MULTIVITAMIN MEN 50+) TABS Take by mouth daily      triamcinolone (KENALOG) 0.1 % ointment Apply topically 2 times daily Apply topically 2 times daily.       No current facility-administered medications for this visit.       Objective   Vital Signs  /74 (Site: Right Upper Arm, Position: Sitting, Cuff Size: Medium Adult)   Pulse 63   Resp 16   Ht 1.765 m (5' 9.5\")   Wt 80.4 kg (177 lb 3.2 oz)   SpO2 100%   BMI 25.79 kg/m²     Physical Examination  Well appearing, NAD      Assessment and Plan   Kwaku Cobb is a 69 y.o. with well controlled HTN, HLD who is here for chronic condition follow up. Patient prefers to get quarterly lab work which we

## 2024-10-25 NOTE — PROGRESS NOTES
Room 12    Identified pt with two pt identifiers(name and ). Reviewed record in preparation for visit and have obtained necessary documentation.  Chief Complaint   Patient presents with    Follow-up Chronic Condition     Per patient, DX of HTN on file. Currently on medication used for HTN but never formally DX with HTN.   Immunization Records provided        Health Maintenance Due   Topic    Pneumococcal 65+ years Vaccine (1 of 2 - PCV)    Diabetic foot exam     Hepatitis C screen     DTaP/Tdap/Td vaccine (1 - Tdap)    Colorectal Cancer Screen     Shingles vaccine (1 of 2)    Respiratory Syncytial Virus (RSV) Pregnant or age 60 yrs+ (1 - 1-dose 60+ series)    AAA screen     Annual Wellness Visit (Medicare)     Flu vaccine (1)    COVID-19 Vaccine ( -  season)       Vitals:    10/25/24 0802   Resp: 16   Weight: 80.4 kg (177 lb 3.2 oz)   Height: 1.765 m (5' 9.5\")         \"Have you been to the ER, urgent care clinic since your last visit?  Hospitalized since your last visit?\"    NO    “Have you seen or consulted any other health care providers outside of [facility] since your last visit?”    Dermatology - [facility]         “Have you had a colorectal cancer screening such as a colonoscopy/FIT/Cologuard?    Yes. [facility]     No colonoscopy on file  No cologuard on file  No FIT/FOBT on file   No flexible sigmoidoscopy on file         Click Here for Release of Records Request     This patient is accompanied in the office by his self.  I have received verbal consent from Kwaku Cobb to discuss any/all medical information while they are present in the room.

## 2024-10-26 LAB — TESTOST SERPL-MCNC: 442 NG/DL (ref 264–916)

## 2024-10-29 LAB
TESTOST FREE SERPL-MCNC: 10.2 PG/ML (ref 6.6–18.1)
TESTOST SERPL-MCNC: 442 NG/DL (ref 264–916)

## 2025-01-27 SDOH — HEALTH STABILITY: PHYSICAL HEALTH: ON AVERAGE, HOW MANY MINUTES DO YOU ENGAGE IN EXERCISE AT THIS LEVEL?: 40 MIN

## 2025-01-27 SDOH — HEALTH STABILITY: PHYSICAL HEALTH: ON AVERAGE, HOW MANY DAYS PER WEEK DO YOU ENGAGE IN MODERATE TO STRENUOUS EXERCISE (LIKE A BRISK WALK)?: 5 DAYS

## 2025-01-27 ASSESSMENT — LIFESTYLE VARIABLES
HOW MANY STANDARD DRINKS CONTAINING ALCOHOL DO YOU HAVE ON A TYPICAL DAY: PATIENT DOES NOT DRINK
HAVE YOU OR SOMEONE ELSE BEEN INJURED AS A RESULT OF YOUR DRINKING: NO
HOW OFTEN DO YOU HAVE A DRINK CONTAINING ALCOHOL: 5
HOW MANY STANDARD DRINKS CONTAINING ALCOHOL DO YOU HAVE ON A TYPICAL DAY: 0
HOW OFTEN DURING THE LAST YEAR HAVE YOU FOUND THAT YOU WERE NOT ABLE TO STOP DRINKING ONCE YOU HAD STARTED: NEVER
HOW OFTEN DURING THE LAST YEAR HAVE YOU HAD A FEELING OF GUILT OR REMORSE AFTER DRINKING: NEVER
HOW OFTEN DO YOU HAVE A DRINK CONTAINING ALCOHOL: 4 OR MORE TIMES A WEEK
HOW OFTEN DO YOU HAVE SIX OR MORE DRINKS ON ONE OCCASION: 1
HOW OFTEN DURING THE LAST YEAR HAVE YOU HAD A FEELING OF GUILT OR REMORSE AFTER DRINKING: NEVER
HOW OFTEN DURING THE LAST YEAR HAVE YOU BEEN UNABLE TO REMEMBER WHAT HAPPENED THE NIGHT BEFORE BECAUSE YOU HAD BEEN DRINKING: NEVER
HOW OFTEN DURING THE LAST YEAR HAVE YOU BEEN UNABLE TO REMEMBER WHAT HAPPENED THE NIGHT BEFORE BECAUSE YOU HAD BEEN DRINKING: NEVER
HOW OFTEN DURING THE LAST YEAR HAVE YOU NEEDED AN ALCOHOLIC DRINK FIRST THING IN THE MORNING TO GET YOURSELF GOING AFTER A NIGHT OF HEAVY DRINKING: NEVER
HAS A RELATIVE, FRIEND, DOCTOR, OR ANOTHER HEALTH PROFESSIONAL EXPRESSED CONCERN ABOUT YOUR DRINKING OR SUGGESTED YOU CUT DOWN: NO
HAS A RELATIVE, FRIEND, DOCTOR, OR ANOTHER HEALTH PROFESSIONAL EXPRESSED CONCERN ABOUT YOUR DRINKING OR SUGGESTED YOU CUT DOWN: NO
HOW OFTEN DURING THE LAST YEAR HAVE YOU NEEDED AN ALCOHOLIC DRINK FIRST THING IN THE MORNING TO GET YOURSELF GOING AFTER A NIGHT OF HEAVY DRINKING: NEVER
HOW OFTEN DURING THE LAST YEAR HAVE YOU FOUND THAT YOU WERE NOT ABLE TO STOP DRINKING ONCE YOU HAD STARTED: NEVER
HOW OFTEN DURING THE LAST YEAR HAVE YOU FAILED TO DO WHAT WAS NORMALLY EXPECTED FROM YOU BECAUSE OF DRINKING: NEVER
HOW OFTEN DURING THE LAST YEAR HAVE YOU FAILED TO DO WHAT WAS NORMALLY EXPECTED FROM YOU BECAUSE OF DRINKING: NEVER
HAVE YOU OR SOMEONE ELSE BEEN INJURED AS A RESULT OF YOUR DRINKING: NO

## 2025-01-27 ASSESSMENT — PATIENT HEALTH QUESTIONNAIRE - PHQ9
SUM OF ALL RESPONSES TO PHQ9 QUESTIONS 1 & 2: 0
SUM OF ALL RESPONSES TO PHQ QUESTIONS 1-9: 0
2. FEELING DOWN, DEPRESSED OR HOPELESS: NOT AT ALL
1. LITTLE INTEREST OR PLEASURE IN DOING THINGS: NOT AT ALL
SUM OF ALL RESPONSES TO PHQ QUESTIONS 1-9: 0

## 2025-01-28 SDOH — ECONOMIC STABILITY: FOOD INSECURITY: WITHIN THE PAST 12 MONTHS, THE FOOD YOU BOUGHT JUST DIDN'T LAST AND YOU DIDN'T HAVE MONEY TO GET MORE.: PATIENT DECLINED

## 2025-01-28 SDOH — ECONOMIC STABILITY: TRANSPORTATION INSECURITY
IN THE PAST 12 MONTHS, HAS THE LACK OF TRANSPORTATION KEPT YOU FROM MEDICAL APPOINTMENTS OR FROM GETTING MEDICATIONS?: PATIENT DECLINED

## 2025-01-28 SDOH — ECONOMIC STABILITY: FOOD INSECURITY: WITHIN THE PAST 12 MONTHS, YOU WORRIED THAT YOUR FOOD WOULD RUN OUT BEFORE YOU GOT MONEY TO BUY MORE.: PATIENT DECLINED

## 2025-01-28 SDOH — ECONOMIC STABILITY: TRANSPORTATION INSECURITY
IN THE PAST 12 MONTHS, HAS LACK OF TRANSPORTATION KEPT YOU FROM MEETINGS, WORK, OR FROM GETTING THINGS NEEDED FOR DAILY LIVING?: PATIENT DECLINED

## 2025-01-28 SDOH — ECONOMIC STABILITY: INCOME INSECURITY: IN THE LAST 12 MONTHS, WAS THERE A TIME WHEN YOU WERE NOT ABLE TO PAY THE MORTGAGE OR RENT ON TIME?: PATIENT DECLINED

## 2025-01-31 ENCOUNTER — OFFICE VISIT (OUTPATIENT)
Age: 70
End: 2025-01-31

## 2025-01-31 VITALS
OXYGEN SATURATION: 99 % | DIASTOLIC BLOOD PRESSURE: 76 MMHG | HEART RATE: 68 BPM | BODY MASS INDEX: 26.64 KG/M2 | SYSTOLIC BLOOD PRESSURE: 124 MMHG | TEMPERATURE: 98 F | WEIGHT: 183 LBS

## 2025-01-31 DIAGNOSIS — Z00.00 INITIAL MEDICARE ANNUAL WELLNESS VISIT: Primary | ICD-10-CM

## 2025-01-31 DIAGNOSIS — E78.2 MIXED HYPERLIPIDEMIA: ICD-10-CM

## 2025-01-31 DIAGNOSIS — E11.9 TYPE 2 DIABETES MELLITUS WITHOUT COMPLICATION, WITHOUT LONG-TERM CURRENT USE OF INSULIN (HCC): ICD-10-CM

## 2025-01-31 DIAGNOSIS — I10 PRIMARY HYPERTENSION: ICD-10-CM

## 2025-01-31 LAB
ANION GAP SERPL CALC-SCNC: 8 MMOL/L (ref 2–12)
BUN SERPL-MCNC: 22 MG/DL (ref 6–20)
BUN/CREAT SERPL: 17 (ref 12–20)
CALCIUM SERPL-MCNC: 9.9 MG/DL (ref 8.5–10.1)
CHLORIDE SERPL-SCNC: 103 MMOL/L (ref 97–108)
CHOLEST SERPL-MCNC: 160 MG/DL
CO2 SERPL-SCNC: 25 MMOL/L (ref 21–32)
CREAT SERPL-MCNC: 1.3 MG/DL (ref 0.7–1.3)
ERYTHROCYTE [DISTWIDTH] IN BLOOD BY AUTOMATED COUNT: 12.8 % (ref 11.5–14.5)
EST. AVERAGE GLUCOSE BLD GHB EST-MCNC: 157 MG/DL
GLUCOSE SERPL-MCNC: 129 MG/DL (ref 65–100)
HBA1C MFR BLD: 7.1 % (ref 4–5.6)
HCT VFR BLD AUTO: 40.2 % (ref 36.6–50.3)
HDLC SERPL-MCNC: 46 MG/DL
HDLC SERPL: 3.5 (ref 0–5)
HGB BLD-MCNC: 13.1 G/DL (ref 12.1–17)
LDLC SERPL CALC-MCNC: 81.4 MG/DL (ref 0–100)
MCH RBC QN AUTO: 28.9 PG (ref 26–34)
MCHC RBC AUTO-ENTMCNC: 32.6 G/DL (ref 30–36.5)
MCV RBC AUTO: 88.5 FL (ref 80–99)
NRBC # BLD: 0 K/UL (ref 0–0.01)
NRBC BLD-RTO: 0 PER 100 WBC
PLATELET # BLD AUTO: 252 K/UL (ref 150–400)
PMV BLD AUTO: 12.2 FL (ref 8.9–12.9)
POTASSIUM SERPL-SCNC: 4.8 MMOL/L (ref 3.5–5.1)
RBC # BLD AUTO: 4.54 M/UL (ref 4.1–5.7)
SODIUM SERPL-SCNC: 136 MMOL/L (ref 136–145)
TRIGL SERPL-MCNC: 163 MG/DL
VLDLC SERPL CALC-MCNC: 32.6 MG/DL
WBC # BLD AUTO: 9.3 K/UL (ref 4.1–11.1)

## 2025-01-31 RX ORDER — CETIRIZINE HYDROCHLORIDE 5 MG/1
5 TABLET ORAL DAILY
COMMUNITY

## 2025-01-31 SDOH — ECONOMIC STABILITY: FOOD INSECURITY: WITHIN THE PAST 12 MONTHS, THE FOOD YOU BOUGHT JUST DIDN'T LAST AND YOU DIDN'T HAVE MONEY TO GET MORE.: NEVER TRUE

## 2025-01-31 SDOH — ECONOMIC STABILITY: FOOD INSECURITY: WITHIN THE PAST 12 MONTHS, YOU WORRIED THAT YOUR FOOD WOULD RUN OUT BEFORE YOU GOT MONEY TO BUY MORE.: NEVER TRUE

## 2025-01-31 NOTE — PROGRESS NOTES
I reviewed the patient's medical history, the resident's findings on physical examination, the patient's diagnoses, and treatment plan as documented in the resident note.  I concur with the treatment plan as documented.     Chanda Latham MD 1/31/2025

## 2025-01-31 NOTE — PROGRESS NOTES
Medicare Annual Wellness Visit    Kwaku Cobb is here for Medicare AWV    Assessment & Plan   Initial Medicare annual wellness visit     Return in 3 months (on 4/30/2025).  - Patient quit smoking  - Wants \"full physical\" with CXR and EKG at next visit  - Basic lab work today (gets quarterly)       Subjective     Patient's complete Health Risk Assessment and screening values have been reviewed and are found in Flowsheets. The following problems were reviewed today and where indicated follow up appointments were made and/or referrals ordered.    Positive Risk Factor Screenings with Interventions:                    Safety:  Do you have non-slip mats or non-slip surfaces or shower bars or grab bars in your shower or bathtub?: (!) No  Interventions:  None necessary     Advanced Directives:  Do you have a Living Will?: (!) No    Intervention:  Deferred formal conversation today. Patient wishes to remain FULL CODE. Will discuss with his wife in regards to advanced care planning      Tobacco Use:    Tobacco Use      Smoking status: Every Day        Types: Cigars        Passive exposure: Never      Smokeless tobacco: Never     Interventions:  Patient quit smoking 2 months ago                      Objective   Vitals:    01/31/25 0933   BP: 124/76   Pulse: 68   Temp: 98 °F (36.7 °C)   TempSrc: Oral   SpO2: 99%   Weight: 83 kg (183 lb)      Body mass index is 26.64 kg/m².                    Allergies   Allergen Reactions    Alpha-Gal Itching     Prior to Visit Medications    Medication Sig Taking? Authorizing Provider   cetirizine (ZYRTEC) 5 MG tablet Take 1 tablet by mouth daily Yes Provider, MD Karoline   simvastatin (ZOCOR) 40 MG tablet Take 1 tablet by mouth nightly Yes Benson Chaudhary DO   metFORMIN (GLUCOPHAGE) 500 MG tablet Take 1 tablet by mouth 2 times daily (with meals) Taking 2 tablets BID Yes Benson Chaudhary DO   lisinopril (PRINIVIL;ZESTRIL) 5 MG tablet Take 1 tablet by mouth daily Yes Benson Chaudhary DO

## 2025-01-31 NOTE — PROGRESS NOTES
Identified pt with two pt identifiers(name and ). Reviewed record in preparation for visit and have obtained necessary documentation.  Chief Complaint   Patient presents with    Medicare AWV      Vitals:    25 0933   BP: 124/76   Pulse: 68   Temp: 98 °F (36.7 °C)   TempSrc: Oral   SpO2: 99%   Weight: 83 kg (183 lb)       Coordination of Care Questionnaire:  :     \"Have you been to the ER, urgent care clinic since your last visit?  Hospitalized since your last visit?\"    NO    “Have you seen or consulted any other health care providers outside of [facility] since your last visit?”    NO        “Have you had a colorectal cancer screening such as a colonoscopy/FIT/Cologuard?    NO    No colonoscopy on file  No cologuard on file  No FIT/FOBT on file   No flexible sigmoidoscopy on file         Click Here for Release of Records Request

## 2025-02-07 ENCOUNTER — PATIENT MESSAGE (OUTPATIENT)
Age: 70
End: 2025-02-07

## 2025-02-11 NOTE — TELEPHONE ENCOUNTER
Medication Refill Request    Kwaku Cobb is requesting a refill of the following medication(s):   Requested Prescriptions     Pending Prescriptions Disp Refills    cetirizine (ZYRTEC) 5 MG tablet       Sig: Take 1 tablet by mouth daily        Listed PCP is Benson Chaudhary,    Last provider to prescribe medication: Provider, Historical   Date of Last Office Visit at [facility]: 1/31/2025 with Dr. Chaudhary    FUTURE [facility] APPOINTMENT: Visit date not found    Please send refill to:    [id number] - [place], [place] - [address] [phone] - F [phone]  [address]  [facility] [address]  Phone: [phone] Fax: [phone]      Please review request and approve or deny with recommendations within 48 hours.

## 2025-02-15 RX ORDER — CETIRIZINE HYDROCHLORIDE 5 MG/1
5 TABLET ORAL DAILY
Qty: 90 TABLET | Refills: 3 | Status: SHIPPED | OUTPATIENT
Start: 2025-02-15

## 2025-03-18 NOTE — TELEPHONE ENCOUNTER
Medication Refill Request    Kwaku Cobb is requesting a refill of the following medication(s):   Requested Prescriptions     Pending Prescriptions Disp Refills    metFORMIN (GLUCOPHAGE) 500 MG tablet [Pharmacy Med Name: METFORMIN  MG TABLET] 360 tablet      Sig: TAKE 2 TABLETS BY MOUTH TWICE A DAY WITH MEALS        Listed PCP is Benson Chaudhary DO   Last provider to prescribe medication: Dr. Chaudhary on 9/20/24  Date of Last Office Visit at [facility]: 1/31/2025 with Dr. Chaudhary    FUTURE [facility] APPOINTMENT: Visit date not found    Please send refill to:    [facility] [id number] [place], [place] - [address] [phone] - F [phone]  [address]  [facility] [address]  Phone: [phone] Fax: [phone]      Please review request and approve or deny with recommendations within 48 hours.

## 2025-05-01 ENCOUNTER — OFFICE VISIT (OUTPATIENT)
Age: 70
End: 2025-05-01
Payer: MEDICARE

## 2025-05-01 VITALS
WEIGHT: 184.3 LBS | HEIGHT: 70 IN | TEMPERATURE: 98.2 F | DIASTOLIC BLOOD PRESSURE: 79 MMHG | OXYGEN SATURATION: 97 % | SYSTOLIC BLOOD PRESSURE: 118 MMHG | RESPIRATION RATE: 18 BRPM | BODY MASS INDEX: 26.39 KG/M2 | HEART RATE: 67 BPM

## 2025-05-01 DIAGNOSIS — Z12.5 SCREENING PSA (PROSTATE SPECIFIC ANTIGEN): ICD-10-CM

## 2025-05-01 DIAGNOSIS — E11.9 TYPE 2 DIABETES MELLITUS WITHOUT COMPLICATION, WITHOUT LONG-TERM CURRENT USE OF INSULIN (HCC): Primary | ICD-10-CM

## 2025-05-01 DIAGNOSIS — Z13.6 SCREENING FOR AAA (ABDOMINAL AORTIC ANEURYSM): ICD-10-CM

## 2025-05-01 DIAGNOSIS — E78.2 MIXED HYPERLIPIDEMIA: ICD-10-CM

## 2025-05-01 DIAGNOSIS — Z87.891 SMOKING HISTORY: ICD-10-CM

## 2025-05-01 DIAGNOSIS — Z87.891 PERSONAL HISTORY OF TOBACCO USE, PRESENTING HAZARDS TO HEALTH: ICD-10-CM

## 2025-05-01 DIAGNOSIS — I10 PRIMARY HYPERTENSION: ICD-10-CM

## 2025-05-01 PROCEDURE — 99213 OFFICE O/P EST LOW 20 MIN: CPT

## 2025-05-01 ASSESSMENT — PATIENT HEALTH QUESTIONNAIRE - PHQ9
2. FEELING DOWN, DEPRESSED OR HOPELESS: NOT AT ALL
SUM OF ALL RESPONSES TO PHQ QUESTIONS 1-9: 0
1. LITTLE INTEREST OR PLEASURE IN DOING THINGS: NOT AT ALL

## 2025-05-01 NOTE — PROGRESS NOTES
Identified pt with two pt identifiers(name and ). Reviewed record in preparation for visit and have obtained necessary documentation.  Chief Complaint   Patient presents with    Follow-up Chronic Condition        Health Maintenance Due   Topic    Diabetic foot exam     Hepatitis C screen     Colorectal Cancer Screen     Pneumococcal 50+ years Vaccine (2 of 2 - PCV)    AAA screen     COVID-19 Vaccine ( season)       Vitals:    25 1303   BP: 118/79   BP Site: Right Upper Arm   Patient Position: Sitting   BP Cuff Size: Medium Adult   Pulse: 67   Resp: 18   Temp: 98.2 °F (36.8 °C)   TempSrc: Oral   SpO2: 97%   Weight: 83.6 kg (184 lb 4.9 oz)   Height: 1.765 m (5' 9.5\")         \"Have you been to the ER, urgent care clinic since your last visit?  Hospitalized since your last visit?\"    NO    “Have you seen or consulted any other health care providers outside of [facility] since your last visit?”    NO      “Have you had a colorectal cancer screening such as a colonoscopy/FIT/Cologuard?    YES - Type: Colonoscopy - Where: Uncertain of where Nurse/CMA to request most recent records if not in the chart     No colonoscopy on file  No cologuard on file  No FIT/FOBT on file   No flexible sigmoidoscopy on file         Click Here for Release of Records Request     This patient is accompanied in the office by his self.  I have received verbal consent from Kwaku Cobb to discuss any/all medical information while they are present in the room.

## 2025-05-01 NOTE — PROGRESS NOTES
[address] [place] [address]   Office [phone], Fax [phone]   Subjective     Chief Complaint   Patient presents with    Follow-up Chronic Condition      Kwaku Cobb is a 70 y.o. male who presents for the above.    No significant health updates today. Stays abstinent of cigar smoking. 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     Past Medical History  Past Medical History:   Diagnosis Date    Diabetes mellitus (HCC)     JUDE (obstructive sleep apnea)     CPAP    Type 2 diabetes mellitus without complication (HCC)        Current Medications  Current Outpatient Medications   Medication Sig Dispense Refill    metFORMIN (GLUCOPHAGE) 500 MG tablet TAKE 2 TABLETS BY MOUTH TWICE A DAY WITH MEALS 360 tablet 3    cetirizine (ZYRTEC) 5 MG tablet Take 1 tablet by mouth daily 90 tablet 3    simvastatin (ZOCOR) 40 MG tablet Take 1 tablet by mouth nightly 90 tablet 3    lisinopril (PRINIVIL;ZESTRIL) 5 MG tablet Take 1 tablet by mouth daily 90 tablet 3    Multiple Vitamins-Minerals (MULTIVITAMIN MEN 50+) TABS Take by mouth daily      triamcinolone (KENALOG) 0.1 % ointment Apply topically 2 times daily Apply topically 2 times daily.       No current facility-administered medications for this visit.       Objective   Vital Signs  /79 (BP Site: Right Upper Arm, Patient Position: Sitting, BP Cuff Size: Medium Adult)   Pulse 67   Temp 98.2 °F (36.8 °C) (Oral)   Resp 18   Ht 1.765 m (5' 9.5\")   Wt 83.6 kg (184 lb 4.9 oz)   SpO2 97%   BMI 26.83 kg/m²     Physical Examination  Physical Exam  Constitutional:       General: He is not in acute distress.     Appearance: Normal appearance. He is not ill-appearing.   HENT:      Right Ear: Tympanic membrane normal.      Left Ear: Tympanic membrane normal.   Eyes:      Extraocular Movements: Extraocular movements intact.      Pupils: Pupils are equal, round, and reactive to light.   Cardiovascular:      Rate and Rhythm: Normal

## 2025-05-02 LAB
ALBUMIN SERPL-MCNC: 4.5 G/DL (ref 3.5–5)
ALBUMIN/GLOB SERPL: 1.4 (ref 1.1–2.2)
ALP SERPL-CCNC: 73 U/L (ref 45–117)
ALT SERPL-CCNC: 29 U/L (ref 12–78)
ANION GAP SERPL CALC-SCNC: 7 MMOL/L (ref 2–12)
AST SERPL-CCNC: 20 U/L (ref 15–37)
BILIRUB SERPL-MCNC: 0.6 MG/DL (ref 0.2–1)
BUN SERPL-MCNC: 19 MG/DL (ref 6–20)
BUN/CREAT SERPL: 17 (ref 12–20)
CALCIUM SERPL-MCNC: 10 MG/DL (ref 8.5–10.1)
CHLORIDE SERPL-SCNC: 106 MMOL/L (ref 97–108)
CHOLEST SERPL-MCNC: 156 MG/DL
CO2 SERPL-SCNC: 26 MMOL/L (ref 21–32)
CREAT SERPL-MCNC: 1.15 MG/DL (ref 0.7–1.3)
ERYTHROCYTE [DISTWIDTH] IN BLOOD BY AUTOMATED COUNT: 12.6 % (ref 11.5–14.5)
EST. AVERAGE GLUCOSE BLD GHB EST-MCNC: 131 MG/DL
GLOBULIN SER CALC-MCNC: 3.3 G/DL (ref 2–4)
GLUCOSE SERPL-MCNC: 111 MG/DL (ref 65–100)
HBA1C MFR BLD: 6.2 % (ref 4–5.6)
HCT VFR BLD AUTO: 40.1 % (ref 36.6–50.3)
HDLC SERPL-MCNC: 39 MG/DL
HDLC SERPL: 4 (ref 0–5)
HGB BLD-MCNC: 12.9 G/DL (ref 12.1–17)
LDLC SERPL CALC-MCNC: 84.2 MG/DL (ref 0–100)
MCH RBC QN AUTO: 29.7 PG (ref 26–34)
MCHC RBC AUTO-ENTMCNC: 32.2 G/DL (ref 30–36.5)
MCV RBC AUTO: 92.4 FL (ref 80–99)
NRBC # BLD: 0 K/UL (ref 0–0.01)
NRBC BLD-RTO: 0 PER 100 WBC
PLATELET # BLD AUTO: 245 K/UL (ref 150–400)
PMV BLD AUTO: 12.6 FL (ref 8.9–12.9)
POTASSIUM SERPL-SCNC: 4.1 MMOL/L (ref 3.5–5.1)
PROT SERPL-MCNC: 7.8 G/DL (ref 6.4–8.2)
PSA SERPL-MCNC: 2.6 NG/ML (ref 0.01–4)
RBC # BLD AUTO: 4.34 M/UL (ref 4.1–5.7)
SODIUM SERPL-SCNC: 139 MMOL/L (ref 136–145)
TRIGL SERPL-MCNC: 164 MG/DL
VLDLC SERPL CALC-MCNC: 32.8 MG/DL
WBC # BLD AUTO: 9.4 K/UL (ref 4.1–11.1)

## 2025-05-07 ENCOUNTER — RESULTS FOLLOW-UP (OUTPATIENT)
Age: 70
End: 2025-05-07

## 2025-06-25 ENCOUNTER — HOSPITAL ENCOUNTER (OUTPATIENT)
Facility: HOSPITAL | Age: 70
Discharge: HOME | End: 2025-06-28
Payer: MEDICARE

## 2025-06-25 DIAGNOSIS — Z13.6 SCREENING FOR AAA (ABDOMINAL AORTIC ANEURYSM): ICD-10-CM

## 2025-06-25 DIAGNOSIS — Z87.891 SMOKING HISTORY: ICD-10-CM

## 2025-06-25 DIAGNOSIS — Z87.891 PERSONAL HISTORY OF TOBACCO USE, PRESENTING HAZARDS TO HEALTH: ICD-10-CM

## 2025-06-25 PROCEDURE — 76706 US ABDL AORTA SCREEN AAA: CPT

## 2025-06-25 PROCEDURE — 71271 CT THORAX LUNG CANCER SCR C-: CPT

## (undated) DEVICE — PAD BD MATTRESS 73X32 IN STD CONVOLUTED FOAM LTX FREE

## (undated) DEVICE — SEAL

## (undated) DEVICE — SUTURE ETHIBOND EXCEL SZ 0 L36IN NONABSORBABLE GRN SH L26MM X524H

## (undated) DEVICE — GLOVE ORTHO 8   MSG9480

## (undated) DEVICE — SOLUTION IRRIG 500ML 0.9% SOD CHLO USP POUR PLAS BTL

## (undated) DEVICE — ARM DRAPE

## (undated) DEVICE — ROBOTIC GENERAL-SFMC: Brand: MEDLINE INDUSTRIES, INC.

## (undated) DEVICE — AIRSEAL BIFURCATED SMOKE EVAC FILTERED TUBE SET: Brand: AIRSEAL

## (undated) DEVICE — TRANSFER SET 3": Brand: MEDLINE INDUSTRIES, INC.

## (undated) DEVICE — LIQUIBAND RAPID ADHESIVE 36/CS 0.8ML: Brand: MEDLINE

## (undated) DEVICE — SUTURE DEV SZ 2-0 WND CLSR ABSRB GS-22 VLOC COVIDIEN VLOCM2145

## (undated) DEVICE — TIP COVER ACCESSORY

## (undated) DEVICE — SUTURE MONOCRYL SZ 4-0 L27IN ABSRB UD L19MM PS-2 1/2 CIR PRIM Y426H

## (undated) DEVICE — BLADELESS OBTURATOR: Brand: WECK VISTA

## (undated) DEVICE — SOLUTION IRRIG 1000ML STRL H2O USP PLAS POUR BTL